# Patient Record
Sex: MALE | Race: ASIAN | URBAN - METROPOLITAN AREA
[De-identification: names, ages, dates, MRNs, and addresses within clinical notes are randomized per-mention and may not be internally consistent; named-entity substitution may affect disease eponyms.]

---

## 2023-01-31 SDOH — HEALTH STABILITY: PHYSICAL HEALTH: ON AVERAGE, HOW MANY MINUTES DO YOU ENGAGE IN EXERCISE AT THIS LEVEL?: 30 MIN

## 2023-01-31 SDOH — HEALTH STABILITY: PHYSICAL HEALTH: ON AVERAGE, HOW MANY DAYS PER WEEK DO YOU ENGAGE IN MODERATE TO STRENUOUS EXERCISE (LIKE A BRISK WALK)?: 2 DAYS

## 2023-02-01 ENCOUNTER — OFFICE VISIT (OUTPATIENT)
Dept: INTERNAL MEDICINE CLINIC | Age: 28
End: 2023-02-01
Payer: COMMERCIAL

## 2023-02-01 VITALS
HEIGHT: 72 IN | HEART RATE: 106 BPM | DIASTOLIC BLOOD PRESSURE: 78 MMHG | SYSTOLIC BLOOD PRESSURE: 124 MMHG | WEIGHT: 247 LBS | BODY MASS INDEX: 33.46 KG/M2 | OXYGEN SATURATION: 99 %

## 2023-02-01 DIAGNOSIS — M25.571 ACUTE RIGHT ANKLE PAIN: Primary | ICD-10-CM

## 2023-02-01 DIAGNOSIS — Z00.00 ROUTINE GENERAL MEDICAL EXAMINATION AT HEALTH CARE FACILITY: ICD-10-CM

## 2023-02-01 DIAGNOSIS — Z13.220 LIPID SCREENING: ICD-10-CM

## 2023-02-01 DIAGNOSIS — Z13.1 DIABETES MELLITUS SCREENING: ICD-10-CM

## 2023-02-01 PROCEDURE — 99203 OFFICE O/P NEW LOW 30 MIN: CPT | Performed by: INTERNAL MEDICINE

## 2023-02-01 RX ORDER — METHYLPREDNISOLONE 4 MG/1
TABLET ORAL
Qty: 1 KIT | Refills: 0 | Status: SHIPPED | OUTPATIENT
Start: 2023-02-01 | End: 2023-02-07

## 2023-02-01 SDOH — ECONOMIC STABILITY: HOUSING INSECURITY
IN THE LAST 12 MONTHS, WAS THERE A TIME WHEN YOU DID NOT HAVE A STEADY PLACE TO SLEEP OR SLEPT IN A SHELTER (INCLUDING NOW)?: NO

## 2023-02-01 SDOH — ECONOMIC STABILITY: INCOME INSECURITY: HOW HARD IS IT FOR YOU TO PAY FOR THE VERY BASICS LIKE FOOD, HOUSING, MEDICAL CARE, AND HEATING?: NOT HARD AT ALL

## 2023-02-01 SDOH — ECONOMIC STABILITY: FOOD INSECURITY: WITHIN THE PAST 12 MONTHS, THE FOOD YOU BOUGHT JUST DIDN'T LAST AND YOU DIDN'T HAVE MONEY TO GET MORE.: NEVER TRUE

## 2023-02-01 SDOH — ECONOMIC STABILITY: FOOD INSECURITY: WITHIN THE PAST 12 MONTHS, YOU WORRIED THAT YOUR FOOD WOULD RUN OUT BEFORE YOU GOT MONEY TO BUY MORE.: NEVER TRUE

## 2023-02-01 ASSESSMENT — PATIENT HEALTH QUESTIONNAIRE - PHQ9
SUM OF ALL RESPONSES TO PHQ QUESTIONS 1-9: 0
SUM OF ALL RESPONSES TO PHQ9 QUESTIONS 1 & 2: 0
SUM OF ALL RESPONSES TO PHQ QUESTIONS 1-9: 0
2. FEELING DOWN, DEPRESSED OR HOPELESS: 0
SUM OF ALL RESPONSES TO PHQ QUESTIONS 1-9: 0
1. LITTLE INTEREST OR PLEASURE IN DOING THINGS: 0
SUM OF ALL RESPONSES TO PHQ QUESTIONS 1-9: 0

## 2023-02-01 NOTE — PROGRESS NOTES
Visit Information    Have you changed or started any medications since your last visit including any over-the-counter medicines, vitamins, or herbal medicines? no   Are you having any side effects from any of your medications? -  no  Have you stopped taking any of your medications? Is so, why? -  no    Have you seen any other physician or provider since your last visit? No  Have you had any other diagnostic tests since your last visit? No  Have you been seen in the emergency room and/or had an admission to a hospital since we last saw you? No  Have you had your routine dental cleaning in the past 6 months? no    Have you activated your [email or web address] account? If not, what are your barriers?  Yes     Patient Care Team:  Willa Amaya MD as PCP - General (Internal Medicine)    Medical History Review  Past Medical, Family, and Social History reviewed and does contribute to the patient presenting condition    Health Maintenance   Topic Date Due    COVID-19 Vaccine (1) Never done    Varicella vaccine (1 of 2 - 2-dose childhood series) Never done    HPV vaccine (1 - Male 2-dose series) Never done    Depression Screen  Never done    HIV screen  Never done    Hepatitis C screen  Never done    DTaP/Tdap/Td vaccine (1 - Tdap) Never done    Flu vaccine (1) Never done    Hepatitis A vaccine  Aged Out    Hib vaccine  Aged Out    Meningococcal (ACWY) vaccine  Aged Out    Pneumococcal 0-64 years Vaccine  Aged Out     SUBJECTIVE:  Leydi Tao is a 24 y.o. male who  comes for complaints of   Chief Complaint   Patient presents with    New Patient    Ankle Pain     Right      Moved from [place] 1yr ago  Works in a nail salon    Specialities pt is following with:  none    Chronic conditions being monitored:  none    Concerns today:    Right ankle pain  1mth  Constt, lasts few days,   No h/o injury  Denies swelling, no redness  Walking makes it worse  Rest improves it  Pain is worse at night  OTC meds not much help    Alcohol/smoking- no alcohol, never smoked. No street drug use    REVIEW OF SYSTEMS (except Subjective (HPI))    CONSTITUTIONAL: No weight loss, malaise or fevers  HEENT: Negative for frequent or significant headaches, No changes in hearing or vision, no nose bleeds or other nasal problems,  NECK: Negative for lumps, goiter, pain and significant neck swelling  RESPIRATORY: Negative for cough, hemoptysis, wheezing, or shortness of breath  CARDIOVASCULAR: Negative for chest pain, leg swelling,or palpitations  GI: No nausea, vomiting, or diarrhea or Constipation  : No history of dysuria, frequency or incontinence, or hematuria  MUSCULOSKELETAL: positve for right ankle pain  SKIN: Negative for lesions, rash, and itching  PSYCH: Negative for sleep disturbance, mood disorder and recent psychosocial stressors  NEURO: No history of headaches, syncope, paralysis, seizures or tremors    ACTIVE PROBLEM LIST  There is no problem list on file for this patient. PAST MEDICAL HISTORY:    No past medical history on file. PAST SURGICAL HISTORY:    No past surgical history on file. FAMILY HISTORY:    No family history on file.      SOCIAL HISTORY:    Social History     Socioeconomic History    Marital status: Single     Spouse name: Not on file    Number of children: Not on file    Years of education: Not on file    Highest education level: Not on file   Occupational History    Not on file   Tobacco Use    Smoking status: Never     Passive exposure: Never    Smokeless tobacco: Never   Substance and Sexual Activity    Alcohol use: Never    Drug use: Never    Sexual activity: Not on file   Other Topics Concern    Not on file   Social History Narrative    Not on file     Social Determinants of Health     Financial Resource Strain: Low Risk     Difficulty of Paying Living Expenses: Not hard at all   Food Insecurity: No Food Insecurity    Worried About [address] in the Last Year: Never true    [facility] in the Last Year: Never true   Transportation Needs: Unknown    Lack of Transportation (Medical): Not on file    Lack of Transportation (Non-Medical): No   Physical Activity: Insufficiently Active    Days of Exercise per Week: 2 days    Minutes of Exercise per Session: 30 min   Stress: Not on file   Social Connections: Not on file   Intimate Partner Violence: Not At Risk    Fear of Current or Ex-Partner: No    Emotionally Abused: No    Physically Abused: No    Sexually Abused: No   Housing Stability: Unknown    Unable to Pay for Housing in the Last Year: Not on file    Number of Places Lived in the Last Year: Not on file    Unstable Housing in the Last Year: No       CURRENT MEDICATIONS:  No current outpatient medications on file. No current facility-administered medications for this visit. OBJECTIVE:  Vitals:    02/01/23 1139   BP:    Pulse: (!) 106   SpO2:      /78    Focal exam:  Right ankle- focal bony tenderness in region of navicular bone  No swelling/redness  FROM      General exam (except above):  Constitutional - well appearing, alert, in no acute distress  Eyes: Anicteric sclera. Pupils are equally round and reactive to light. Extraocular movements are intact. Skin: Skin color, texture, turgor normal  Respiratory - Lungs clear to auscultation. No wheezing, rhonchi, rales  Cardiovascular - RRR. S1S2 present. No leg swelling. Gastrointestinal - Abdomen soft, non-tender. Bowel sounds normal. No masses, organomegaly  Musculoskeletal - No joint swelling, deformity, or tenderness  Neuro - Pt Alert, awake and oriented x 3. No gross focal neurological deficits      ASSESSMENT AND PLAN (MEDICAL DECISION MAKING):  Gamal was seen today for new patient and ankle pain. Diagnoses and all orders for this visit:    Acute right ankle pain  -     methylPREDNISolone (MEDROL DOSEPACK) 4 MG tablet; Take by mouth.  -     Uric Acid; Future    Diabetes mellitus screening  -     Glucose, Fasting;  Future    Lipid screening  - Lipid, Fasting; Future    Routine general medical examination at health care facility  -     CBC; Future  -     Comprehensive Metabolic Panel;  Future           Follow up in: Jo De Leon MD

## 2023-02-13 ENCOUNTER — TELEPHONE (OUTPATIENT)
Dept: INTERNAL MEDICINE CLINIC | Age: 28
End: 2023-02-13

## 2023-02-24 ENCOUNTER — OFFICE VISIT (OUTPATIENT)
Dept: INTERNAL MEDICINE CLINIC | Age: 28
End: 2023-02-24
Payer: COMMERCIAL

## 2023-02-24 VITALS
BODY MASS INDEX: 33.32 KG/M2 | HEART RATE: 70 BPM | HEIGHT: 72 IN | OXYGEN SATURATION: 99 % | WEIGHT: 246 LBS | SYSTOLIC BLOOD PRESSURE: 124 MMHG | DIASTOLIC BLOOD PRESSURE: 74 MMHG

## 2023-02-24 DIAGNOSIS — K08.9 CHRONIC DENTAL PAIN: ICD-10-CM

## 2023-02-24 DIAGNOSIS — G89.29 CHRONIC DENTAL PAIN: ICD-10-CM

## 2023-02-24 DIAGNOSIS — M79.671 RIGHT FOOT PAIN: Primary | ICD-10-CM

## 2023-02-24 PROCEDURE — 99213 OFFICE O/P EST LOW 20 MIN: CPT | Performed by: INTERNAL MEDICINE

## 2023-02-24 NOTE — PROGRESS NOTES
Visit Information    Have you changed or started any medications since your last visit including any over-the-counter medicines, vitamins, or herbal medicines? no   Are you having any side effects from any of your medications? -  no  Have you stopped taking any of your medications? Is so, why? -  no    Have you seen any other physician or provider since your last visit? No  Have you had any other diagnostic tests since your last visit? No  Have you been seen in the emergency room and/or had an admission to a hospital since we last saw you? No  Have you had your routine dental cleaning in the past 6 months? no    Have you activated your [employer] account? If not, what are your barriers?  Yes     Patient Care Team:  Brendolyn Rinne, MD as PCP - General (Internal Medicine)  Brendolyn Rinne, MD as PCP - Empaneled Provider    Medical History Review  Past Medical, Family, and Social History reviewed and does contribute to the patient presenting condition    Health Maintenance   Topic Date Due    COVID-19 Vaccine (1) Never done    Varicella vaccine (1 of 2 - 2-dose childhood series) Never done    HPV vaccine (1 - Male 2-dose series) Never done    HIV screen  Never done    Hepatitis C screen  Never done    DTaP/Tdap/Td vaccine (1 - Tdap) Never done    Flu vaccine (1) Never done    Depression Screen  02/01/2024    Hepatitis A vaccine  Aged Out    Hib vaccine  Aged Out    Meningococcal (ACWY) vaccine  Aged Out    Pneumococcal 0-64 years Vaccine  Aged Out     SUBJECTIVE:  Ben Tim is a 34 y.o. male patient who  comes for complaints of   Chief Complaint   Patient presents with    Ankle Pain     right    Dental Pain         Right ankle pain  Here for follo wup  Improved with medrol dose pack  But recurred on stopping    Dental pain  H/o tooth decay in the last 3 yrs   No abnormality noted on exam  Tooth is not tender   No infection  Advised to see dentist      REVIEW OF SYSTEMS (except Subjective (HPI))  GENERAL: No fevers / chills  RESPIRATORY: Negative for cough, wheezing or shortness of breath  CARDIOVASCULAR: Negative for chest pain or palpitations. GI: no nausea, vomiting, or diarrhea  NEURO: No history of headaches    No past medical history on file. SOCIAL HISTORY:  Social History     Socioeconomic History    Marital status: Single     Spouse name: Not on file    Number of children: Not on file    Years of education: Not on file    Highest education level: Not on file   Occupational History    Not on file   Tobacco Use    Smoking status: Never     Passive exposure: Never    Smokeless tobacco: Never   Substance and Sexual Activity    Alcohol use: Never    Drug use: Never    Sexual activity: Not on file   Other Topics Concern    Not on file   Social History Narrative    Not on file     Social Determinants of Health     Financial Resource Strain: Low Risk     Difficulty of Paying Living Expenses: Not hard at all   Food Insecurity: No Food Insecurity    Worried About Running Out of Food in the Last Year: Never true    [address] in the Last Year: Never true   Transportation Needs: Unknown    Lack of Transportation (Medical): Not on file    Lack of Transportation (Non-Medical): No   Physical Activity: Insufficiently Active    Days of Exercise per Week: 2 days    Minutes of Exercise per Session: 30 min   Stress: Not on file   Social Connections: Not on file   Intimate Partner Violence: Not At Risk    Fear of Current or Ex-Partner: No    Emotionally Abused: No    Physically Abused: No    Sexually Abused: No   Housing Stability: Unknown    Unable to Pay for Housing in the Last Year: Not on file    Number of Places Lived in the Last Year: Not on file    Unstable Housing in the Last Year: No           CURRENT MEDICATIONS:  No current outpatient medications on file. No current facility-administered medications for this visit.          OBJECTIVE:  Vitals:    02/24/23 1528   BP: 124/74   Pulse: 70   SpO2: 99%     Body mass index is 33.36 kg/m². Focal exam:    Examined teeth in the area of concern no gum lesions no cheek lesions or tenderness no tooth tenderness on exam no dental infection cavity obvious on inspection    General exam (except above):  General appearance - well appearing, alert, in no acute distress  Head - Atraumatic, normocephalic  Eyes - EOMI, no jaundice or pallor  Extremities -No significant edema, or skin discoloration. Good capillary refill. Neuro - Pt Alert, awake and oriented x 3. No gross focal neurological deficits    ASSESSMENT AND PLAN (MEDICAL DECISION MAKING):   Gamal was seen today for ankle pain and dental pain. Diagnoses and all orders for this visit:    Right foot pain  -     XR FOOT RIGHT (MIN 3 VIEWS);  Future  -     Ambulatory referral to Podiatry    Chronic dental pain  Comments:  Intermittent for couple years-advised to see dentist, can try OTC NSAIDs         Follow up in: As needed      Genesis Vieira MD

## 2023-03-01 ENCOUNTER — TELEPHONE (OUTPATIENT)
Dept: INTERNAL MEDICINE CLINIC | Age: 28
End: 2023-03-01